# Patient Record
Sex: FEMALE | Race: WHITE | URBAN - METROPOLITAN AREA
[De-identification: names, ages, dates, MRNs, and addresses within clinical notes are randomized per-mention and may not be internally consistent; named-entity substitution may affect disease eponyms.]

---

## 2021-08-13 ENCOUNTER — OFFICE VISIT (OUTPATIENT)
Dept: URBAN - METROPOLITAN AREA CLINIC 51 | Facility: CLINIC | Age: 46
End: 2021-08-13

## 2021-08-13 DIAGNOSIS — H44.23 DEGENERATIVE MYOPIA, BILATERAL: Primary | ICD-10-CM

## 2021-08-13 DIAGNOSIS — H04.123 TEAR FILM INSUFFICIENCY OF BILATERAL LACRIMAL GLANDS: ICD-10-CM

## 2021-08-13 PROCEDURE — 92004 COMPRE OPH EXAM NEW PT 1/>: CPT | Performed by: OPTOMETRIST

## 2021-08-13 PROCEDURE — 92134 CPTRZ OPH DX IMG PST SGM RTA: CPT | Performed by: OPTOMETRIST

## 2021-08-13 ASSESSMENT — KERATOMETRY
OD: 47.22
OS: 47.01

## 2021-08-13 ASSESSMENT — INTRAOCULAR PRESSURE
OS: 18
OD: 17

## 2021-08-13 ASSESSMENT — VISUAL ACUITY
OS: 20/40
OD: 20/40

## 2021-08-13 NOTE — IMPRESSION/PLAN
Impression: Degenerative myopia, bilateral: H44.23.  Plan: retina intact , discussed sx/sy rd rtc any changes
will refer for contacts lens fit

## 2021-08-13 NOTE — IMPRESSION/PLAN
Impression: Tear film insufficiency of bilateral lacrimal glands: H04.123.  Plan: refresh for contacts 
warm compress 
lid scubs